# Patient Record
Sex: MALE | Race: WHITE | NOT HISPANIC OR LATINO | URBAN - METROPOLITAN AREA
[De-identification: names, ages, dates, MRNs, and addresses within clinical notes are randomized per-mention and may not be internally consistent; named-entity substitution may affect disease eponyms.]

---

## 2018-10-23 ENCOUNTER — INPATIENT (INPATIENT)
Facility: HOSPITAL | Age: 23
LOS: 14 days | Discharge: HOME | End: 2018-11-07
Attending: PSYCHIATRY & NEUROLOGY | Admitting: PSYCHIATRY & NEUROLOGY
Payer: COMMERCIAL

## 2018-10-23 VITALS
RESPIRATION RATE: 16 BRPM | TEMPERATURE: 99 F | HEART RATE: 100 BPM | DIASTOLIC BLOOD PRESSURE: 78 MMHG | OXYGEN SATURATION: 100 % | SYSTOLIC BLOOD PRESSURE: 135 MMHG

## 2018-10-23 DIAGNOSIS — F33.9 MAJOR DEPRESSIVE DISORDER, RECURRENT, UNSPECIFIED: ICD-10-CM

## 2018-10-23 DIAGNOSIS — F32.9 MAJOR DEPRESSIVE DISORDER, SINGLE EPISODE, UNSPECIFIED: ICD-10-CM

## 2018-10-23 DIAGNOSIS — R69 ILLNESS, UNSPECIFIED: ICD-10-CM

## 2018-10-23 LAB
ALBUMIN SERPL ELPH-MCNC: 4.8 G/DL — SIGNIFICANT CHANGE UP (ref 3.3–5)
ALP SERPL-CCNC: 100 U/L — SIGNIFICANT CHANGE UP (ref 40–120)
ALT FLD-CCNC: 26 U/L — SIGNIFICANT CHANGE UP (ref 4–41)
AMPHET UR-MCNC: NEGATIVE — SIGNIFICANT CHANGE UP
APAP SERPL-MCNC: < 15 UG/ML — LOW (ref 15–25)
APPEARANCE UR: CLEAR — SIGNIFICANT CHANGE UP
AST SERPL-CCNC: 16 U/L — SIGNIFICANT CHANGE UP (ref 4–40)
BARBITURATES UR SCN-MCNC: NEGATIVE — SIGNIFICANT CHANGE UP
BASOPHILS # BLD AUTO: 0.07 K/UL — SIGNIFICANT CHANGE UP (ref 0–0.2)
BASOPHILS NFR BLD AUTO: 0.8 % — SIGNIFICANT CHANGE UP (ref 0–2)
BENZODIAZ UR-MCNC: NEGATIVE — SIGNIFICANT CHANGE UP
BILIRUB SERPL-MCNC: 0.8 MG/DL — SIGNIFICANT CHANGE UP (ref 0.2–1.2)
BILIRUB UR-MCNC: NEGATIVE — SIGNIFICANT CHANGE UP
BLOOD UR QL VISUAL: NEGATIVE — SIGNIFICANT CHANGE UP
BUN SERPL-MCNC: 13 MG/DL — SIGNIFICANT CHANGE UP (ref 7–23)
CALCIUM SERPL-MCNC: 9.8 MG/DL — SIGNIFICANT CHANGE UP (ref 8.4–10.5)
CANNABINOIDS UR-MCNC: NEGATIVE — SIGNIFICANT CHANGE UP
CHLORIDE SERPL-SCNC: 101 MMOL/L — SIGNIFICANT CHANGE UP (ref 98–107)
CO2 SERPL-SCNC: 22 MMOL/L — SIGNIFICANT CHANGE UP (ref 22–31)
COCAINE METAB.OTHER UR-MCNC: NEGATIVE — SIGNIFICANT CHANGE UP
COLOR SPEC: SIGNIFICANT CHANGE UP
CREAT SERPL-MCNC: 0.91 MG/DL — SIGNIFICANT CHANGE UP (ref 0.5–1.3)
EOSINOPHIL # BLD AUTO: 0.19 K/UL — SIGNIFICANT CHANGE UP (ref 0–0.5)
EOSINOPHIL NFR BLD AUTO: 2.3 % — SIGNIFICANT CHANGE UP (ref 0–6)
ETHANOL BLD-MCNC: < 10 MG/DL — SIGNIFICANT CHANGE UP
GLUCOSE SERPL-MCNC: 98 MG/DL — SIGNIFICANT CHANGE UP (ref 70–99)
GLUCOSE UR-MCNC: NEGATIVE — SIGNIFICANT CHANGE UP
HCT VFR BLD CALC: 47.2 % — SIGNIFICANT CHANGE UP (ref 39–50)
HGB BLD-MCNC: 15.3 G/DL — SIGNIFICANT CHANGE UP (ref 13–17)
IMM GRANULOCYTES # BLD AUTO: 0.03 # — SIGNIFICANT CHANGE UP
IMM GRANULOCYTES NFR BLD AUTO: 0.4 % — SIGNIFICANT CHANGE UP (ref 0–1.5)
KETONES UR-MCNC: SIGNIFICANT CHANGE UP
LEUKOCYTE ESTERASE UR-ACNC: NEGATIVE — SIGNIFICANT CHANGE UP
LYMPHOCYTES # BLD AUTO: 1.89 K/UL — SIGNIFICANT CHANGE UP (ref 1–3.3)
LYMPHOCYTES # BLD AUTO: 22.7 % — SIGNIFICANT CHANGE UP (ref 13–44)
MCHC RBC-ENTMCNC: 27.7 PG — SIGNIFICANT CHANGE UP (ref 27–34)
MCHC RBC-ENTMCNC: 32.4 % — SIGNIFICANT CHANGE UP (ref 32–36)
MCV RBC AUTO: 85.4 FL — SIGNIFICANT CHANGE UP (ref 80–100)
METHADONE UR-MCNC: NEGATIVE — SIGNIFICANT CHANGE UP
MONOCYTES # BLD AUTO: 0.57 K/UL — SIGNIFICANT CHANGE UP (ref 0–0.9)
MONOCYTES NFR BLD AUTO: 6.8 % — SIGNIFICANT CHANGE UP (ref 2–14)
NEUTROPHILS # BLD AUTO: 5.59 K/UL — SIGNIFICANT CHANGE UP (ref 1.8–7.4)
NEUTROPHILS NFR BLD AUTO: 67 % — SIGNIFICANT CHANGE UP (ref 43–77)
NITRITE UR-MCNC: NEGATIVE — SIGNIFICANT CHANGE UP
NRBC # FLD: 0 — SIGNIFICANT CHANGE UP
OPIATES UR-MCNC: NEGATIVE — SIGNIFICANT CHANGE UP
OXYCODONE UR-MCNC: NEGATIVE — SIGNIFICANT CHANGE UP
PCP UR-MCNC: NEGATIVE — SIGNIFICANT CHANGE UP
PH UR: 7 — SIGNIFICANT CHANGE UP (ref 5–8)
PLATELET # BLD AUTO: 315 K/UL — SIGNIFICANT CHANGE UP (ref 150–400)
PMV BLD: 10.7 FL — SIGNIFICANT CHANGE UP (ref 7–13)
POTASSIUM SERPL-MCNC: 4.1 MMOL/L — SIGNIFICANT CHANGE UP (ref 3.5–5.3)
POTASSIUM SERPL-SCNC: 4.1 MMOL/L — SIGNIFICANT CHANGE UP (ref 3.5–5.3)
PROT SERPL-MCNC: 8 G/DL — SIGNIFICANT CHANGE UP (ref 6–8.3)
PROT UR-MCNC: NEGATIVE — SIGNIFICANT CHANGE UP
RBC # BLD: 5.53 M/UL — SIGNIFICANT CHANGE UP (ref 4.2–5.8)
RBC # FLD: 13.2 % — SIGNIFICANT CHANGE UP (ref 10.3–14.5)
SALICYLATES SERPL-MCNC: < 5 MG/DL — LOW (ref 15–30)
SODIUM SERPL-SCNC: 139 MMOL/L — SIGNIFICANT CHANGE UP (ref 135–145)
SP GR SPEC: 1.02 — SIGNIFICANT CHANGE UP (ref 1–1.04)
TSH SERPL-MCNC: 2.45 UIU/ML — SIGNIFICANT CHANGE UP (ref 0.27–4.2)
UROBILINOGEN FLD QL: NORMAL — SIGNIFICANT CHANGE UP
WBC # BLD: 8.34 K/UL — SIGNIFICANT CHANGE UP (ref 3.8–10.5)
WBC # FLD AUTO: 8.34 K/UL — SIGNIFICANT CHANGE UP (ref 3.8–10.5)

## 2018-10-23 PROCEDURE — 99285 EMERGENCY DEPT VISIT HI MDM: CPT

## 2018-10-23 RX ORDER — SERTRALINE 25 MG/1
25 TABLET, FILM COATED ORAL DAILY
Qty: 0 | Refills: 0 | Status: DISCONTINUED | OUTPATIENT
Start: 2018-10-23 | End: 2018-10-26

## 2018-10-23 RX ORDER — LANOLIN ALCOHOL/MO/W.PET/CERES
3 CREAM (GRAM) TOPICAL AT BEDTIME
Qty: 0 | Refills: 0 | Status: DISCONTINUED | OUTPATIENT
Start: 2018-10-23 | End: 2018-11-07

## 2018-10-23 RX ADMIN — Medication 1 MILLIGRAM(S): at 21:53

## 2018-10-23 NOTE — ED BEHAVIORAL HEALTH ASSESSMENT NOTE - CASE SUMMARY
22 year old single male, unemployed, non-caregiver, domiciled in a private residence with family, currently attends [employer], no PPH, no history of inpatient admissions, history of one past suicide attempt, no history of aggression/violence or legal issues is BIBA for suicidal ideation referred from student counseling center.  Patient presents to the ER in the context of suicidal ideation with specific plan and intent.  He has been feeling increasingly depressed and worthless due to multiple psychosocial stressors and recently had to withdraw from school due to the severity of depressive symptoms.  Patient is unable to contract for safety outside of the hospital environment and presents an imminent risk to self requiring inpatient admission for safety and stabilization at this time.  Patient agreeing to voluntary inpatient admission and will be admitted to [place] [address] on a 9.13, voluntary legal status as no beds currently available on 1S or 1N.  No need for constant observation in a locked, supervised setting.  Recommend transportation to unit accompanied by security for safety.

## 2018-10-23 NOTE — ED PROVIDER NOTE - PRINCIPAL DIAGNOSIS
Current episode of major depressive disorder without prior episode, unspecified depression episode severity

## 2018-10-23 NOTE — ED PROVIDER NOTE - CARE PLAN
Principal Discharge DX:	Current episode of major depressive disorder without prior episode, unspecified depression episode severity

## 2018-10-23 NOTE — ED BEHAVIORAL HEALTH ASSESSMENT NOTE - HPI (INCLUDE ILLNESS QUALITY, SEVERITY, DURATION, TIMING, CONTEXT, MODIFYING FACTORS, ASSOCIATED SIGNS AND SYMPTOMS)
Patient is a 22 year old single unemployed non-caregiver mixed racial male currently residing in a private residence with family. No PPH. No history of inpatient admissions. Hx of 1 past suicide attempts - attempted Patient is a 22 year old single unemployed non-caregiver mixed racial male currently residing in a private residence with family. Currently attends [employer]. No PPH. No history of inpatient admissions. Hx of 1 past suicide attempt - attempted a few years ago to hang self but tie broke (no medical treatment). He has no history of aggression/violence or legal issues. Patient endorses drinking alcohol 1 shot 1x per week, last drank a few days ago. He denies history of rehab/detox, withdrawal symptoms/DTs or seizures. PMH includes acid reflux and pre-ulcer. BIBA for suicidal ideation.    Patient reports he came to the ER because the school sent him stating he is a threat to himself. He reports he recently withdrew from his classes 3 weeks ago due to "trouble keeping up the work." He went today to organize everything and told the individual at school he was having suicidal ideation to hang himself with a belt. Patient endorses suicidal ideation x a few months to hang himself with a belt which he does have. He stated he has been feeling this due to multiple recent stressors including recently graduating and having to leave his job due to their moving location.    Patient endorses feeling depressed x many years worsening over the past few months, poor sleep, decreased appetite with 10lb weight loss, worthlessness, anxiety, fluctuating concentration and energy. He stated he struggled to get out of bed and go to class and complete his work due to his symptoms and this is why he withdrew. He was unable to contract for safety outside of the hospital stating he does not know if he would be safe if he left and cannot guarantee he wouldn't hang himself tomorrow or the next day. Patient denies any hallucinations, does not report any delusional thought content, denies thought insertion/withdrawal, denies referential thought processes & is not paranoid on interview. Pt is linear, logical, organized and well related. Patient does not report nor exhibit any signs of lynda, including irritable or elevated mood, grandiosity, pressured speech, risk-taking behaviors, increase in productivity or agitation. Patient denies any HI, violent thoughts.     See  note for collateral information.    Patient refused to have any friends or family contacted for further collateral information. Patient is a 22 year old single unemployed non-caregiver mixed racial male currently residing in a private residence with family. Recently withdrew from classes at [employer]. No PPH. No history of inpatient admissions. Hx of 1 past suicide attempt - attempted a few years ago to hang self but tie broke (no medical treatment). He has no history of aggression/violence or legal issues. Patient endorses drinking alcohol 1 shot 1x per week, last drank a few days ago. He denies history of rehab/detox, withdrawal symptoms/DTs or seizures. PMH includes acid reflux and pre-ulcer. BIBA for suicidal ideation.    Patient reports he came to the ER because the school sent him stating he is a threat to himself. He reports he recently withdrew from his classes 3 weeks ago due to "trouble keeping up the work." He went today to organize everything and told the individuals at school he was having suicidal ideation to hang himself with a belt. Patient endorses suicidal ideation x a few months to hang himself with a belt. He stated he has been feeling this way due to multiple recent stressors including recently graduating and having to leave his job due to their moving location.    Patient endorses feeling depressed x many years worsening over the past few months, poor sleep, decreased appetite with 10lb weight loss, worthlessness, anxiety, fluctuating concentration and energy. He stated he struggled to get out of bed, go to class and complete his work due to his symptoms and this is why he withdrew. He was unable to contract for safety outside of the hospital stating he does not know if he would be safe if he left and cannot guarantee he wouldn't hang himself tomorrow or the next day. Patient denies any hallucinations, does not report any delusional thought content, denies thought insertion/withdrawal, denies referential thought processes & is not paranoid on interview. Pt is linear, logical, organized and well related. Patient does not report nor exhibit any signs of lynda, including irritable or elevated mood, grandiosity, pressured speech, risk-taking behaviors, increase in productivity or agitation. Patient denies any HI, violent thoughts.     See  note for collateral information.    Patient refused to have any friends or family contacted for further collateral information or disposition. Patient is a 22 year old single unemployed non-caregiver mixed racial male currently residing in a private residence with family. Recently withdrew from classes at [employer]. No PPH. No history of inpatient admissions. Hx of 1 past suicide attempt - attempted a few years ago to hang self but tie broke (no medical treatment). He has no history of aggression/violence or legal issues. Patient endorses drinking alcohol 1 shot 1x per week, last drank a few days ago. He denies history of rehab/detox, withdrawal symptoms/DTs or seizures. PMH includes acid reflux and pre-ulcer. BIBA for suicidal ideation.    Patient reports he came to the ER because the school sent him stating he is a threat to himself. He reports he recently withdrew from his classes 3 weeks ago due to "trouble keeping up the work." He went today to organize everything and told the individuals at school he was having suicidal ideation to hang himself with a belt. Patient endorses suicidal ideation x a few months to hang himself with a belt. He stated he has been feeling this way due to multiple recent stressors including recently graduating and having to leave his job due to their moving location.    Patient endorses feeling depressed x many years worsening over the past few months, poor sleep, decreased appetite with 10lb weight loss, worthlessness, anxiety, fluctuating concentration and energy. He stated he struggled to get out of bed, go to class and complete his work due to his symptoms and this is why he withdrew. He was unable to contract for safety outside of the hospital stating he does not know if he would be safe if he left and cannot guarantee he wouldn't hang himself tomorrow or the next day. Patient denies any hallucinations, does not report any delusional thought content, denies thought insertion/withdrawal, denies referential thought processes & is not paranoid on interview. Pt is linear, logical, organized and well related. Patient does not report nor exhibit any signs of lynda, including irritable or elevated mood, grandiosity, pressured speech, risk-taking behaviors, increase in productivity or agitation. Patient denies any HI, violent thoughts.     See  note for collateral information.    Patient refused to have any friends or family contacted for further collateral information or to inform regarding disposition.

## 2018-10-23 NOTE — ED ADULT NURSE REASSESSMENT NOTE - NS ED NURSE REASSESS COMMENT FT1
Pt calm & cooperative aware of admission to  Low 3 pt denies Hi/AVh presently pt left with security accompanied by a PES.

## 2018-10-23 NOTE — ED PROVIDER NOTE - OBJECTIVE STATEMENT
Patient is a 22 year old male wit history of depression presenting with worsening depression and SI. Pt notes he has been feeling this way for several months but has worsened over the past several days. He told his school counselor who referred him to psych ED. + plan for hanging. + prior attempt with hanging approx 5 years ago. Denies drugs or etoh. No fevers, chills, chest pain, sob, abd pain. No travel. No meds. + insomnia

## 2018-10-23 NOTE — ED BEHAVIORAL HEALTH ASSESSMENT NOTE - RISK ASSESSMENT
patient presents an imminent risk to self as evidence by suicidal ideation with plan/intent, depression, anhedonia, worthlessness, unable to contract for safety outside of the hospital environment

## 2018-10-23 NOTE — ED BEHAVIORAL HEALTH ASSESSMENT NOTE - OTHER PAST PSYCHIATRIC HISTORY (INCLUDE DETAILS REGARDING ONSET, COURSE OF ILLNESS, INPATIENT/OUTPATIENT TREATMENT)
No PPH. No history of inpatient admissions. Hx of 1 past suicide attempts - attempted a few years ago to hang self but tie broke (no medical treatment). No outpatient treatment.

## 2018-10-23 NOTE — ED BEHAVIORAL HEALTH ASSESSMENT NOTE - SUMMARY
Patient is a 22 year old single unemployed non-caregiver mixed racial male currently residing in a private residence with family. Currently attends [employer]. No PPH. No history of inpatient admissions. Hx of 1 past suicide attempt - attempted a few years ago to hang self but tie broke (no medical treatment). He has no history of aggression/violence or legal issues. Patient endorses drinking alcohol 1 shot 1x per week, last drank a few days ago. He denies history of rehab/detox, withdrawal symptoms/DTs or seizures. PMH includes acid reflux and pre-ulcer. BIBA for suicidal ideation.    Patient presents to the ER in the context of suicidal ideation with plan/intent. Patient has been having suicidal ideation to hang himself with a belt that he owns x a few months. He has been feeling increasingly depressed and worthless due to multiple psychosocial stressors and recently had to withdraw from school due to the severity of symptoms. He is unable to contract for safety outside of the hospital environment and presents an imminent risk to self requiring inpatient admission for safety and stabilization. Patient agreeing to voluntary inpatient admission.    Patient denies active intent/SI while in the hospital. He denied HI and is not aggressive or violent. Patient agreed he could alert staff if he felt worse or had urges to harm self or others. No 1:1 needed. Patient is a 22 year old single unemployed non-caregiver mixed racial male currently residing in a private residence with family. Currently attends [employer]. No PPH. No history of inpatient admissions. Hx of 1 past suicide attempt - attempted a few years ago to hang self but tie broke (no medical treatment). He has no history of aggression/violence or legal issues. Patient endorses drinking alcohol 1 shot 1x per week, last drank a few days ago. He denies history of rehab/detox, withdrawal symptoms/DTs or seizures. PMH includes acid reflux and pre-ulcer. BIBA for suicidal ideation.    Patient presents to the ER in the context of suicidal ideation with plan/intent. Patient has been having suicidal ideation to hang himself with a belt that he owns x a few months. He has been feeling increasingly depressed and worthless due to multiple psychosocial stressors and recently had to withdraw from school due to the severity of symptoms. He is unable to contract for safety outside of the hospital environment and presents an imminent risk to self requiring inpatient admission for safety and stabilization. Patient agreeing to voluntary inpatient admission.    Patient denies active intent to harm self while in the hospital. He denied HI and is not aggressive or violent. Patient agreed he could alert staff if he felt worse or had urges to harm self or others. No 1:1 needed. Patient is a 22 year old single unemployed non-caregiver mixed racial male currently residing in a private residence with family. Currently attends [employer]. No PPH. No history of inpatient admissions. Hx of 1 past suicide attempt - attempted a few years ago to hang self but tie broke (no medical treatment). He has no history of aggression/violence or legal issues. Patient endorses drinking alcohol 1 shot 1x per week, last drank a few days ago. He denies history of rehab/detox, withdrawal symptoms/DTs or seizures. PMH includes acid reflux and pre-ulcer. BIBA for suicidal ideation.    Patient presents to the ER in the context of suicidal ideation with plan/intent. Patient has been having suicidal ideation to hang himself with a belt that he owns x a few months. He has been feeling increasingly depressed and worthless due to multiple psychosocial stressors and recently had to withdraw from school due to the severity of symptoms. He is unable to contract for safety outside of the hospital environment and presents an imminent risk to self requiring inpatient admission for safety and stabilization. Patient agreeing to voluntary inpatient admission.    Patient denies active intent/plan to harm self while in the hospital. He denied HI and is not aggressive or violent. Patient agreed he could alert staff if he felt worse or had urges to harm self or others. No 1:1 needed.

## 2018-10-23 NOTE — ED ADULT TRIAGE NOTE - CHIEF COMPLAINT QUOTE
Pt states that he feel like he wants to kill himself, states that he plans to hang himself with a belt.  States that he previously attempted the same but did not tell anyone.  Denies drugs or alcohol.  Calm and cooperative in triage

## 2018-10-23 NOTE — ED BEHAVIORAL HEALTH ASSESSMENT NOTE - SUICIDE RISK FACTORS
Access to means (pills, firearms, etc.)/Mood episode/Agitation/severe anxiety/Global insomnia/Anhedonia

## 2018-10-23 NOTE — ED ADULT NURSE NOTE - OBJECTIVE STATEMENT
Received pt in  pt calm & cooperative c/o depression & Si pt denies Hi/AVh presently emotional reassurance provided safety & comfort measures maintained eval on going.

## 2018-10-23 NOTE — ED BEHAVIORAL HEALTH NOTE - BEHAVIORAL HEALTH NOTE
Received collateral from Geno Escalante, PhD who is the clinical supervisor [phone] at the student counseling center of [employer] who was seen today for a crisis appointment upon self referral.  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  Patient had reportedly told his sister that his records would belong to her if something should happen to him but no reports of a suicide note.  Patient has a reported past suicide attempt 5 years ago when he tied his tie to a door knob at that time.  No reported history of psychiatric hospitalizations.  Patient has expressed to therapist suicidal plan to hang himself with a belt at this time and reportedly has been drinking alcohol at times at night to assist with sleep difficulties.  Geno Escalante expresses acute safety concerns for patient at this time and is advocating for psychiatric admission.  Upon completion of assessment Geno Escalante was informed of disposition of admission to [address] and is provided unit telephone number.

## 2018-10-23 NOTE — ED ADULT NURSE NOTE - NSIMPLEMENTINTERV_GEN_ALL_ED
Implemented All Universal Safety Interventions:  [place] to call system. Call bell, personal items and telephone within reach. Instruct patient to call for assistance. Room bathroom lighting operational. Non-slip footwear when patient is off stretcher. Physically safe environment: no spills, clutter or unnecessary equipment. Stretcher in lowest position, wheels locked, appropriate side rails in place.

## 2018-10-23 NOTE — ED BEHAVIORAL HEALTH ASSESSMENT NOTE - DESCRIPTION
During course of ED visit patient was calm and cooperative. Patient was not aggressive or violent and did not require PRN medications.     Vital Signs Last 24 Hrs  T(C): 37 (23 Oct 2018 15:25), Max: 37 (23 Oct 2018 15:25)  T(F): 98.6 (23 Oct 2018 15:25), Max: 98.6 (23 Oct 2018 15:25)  HR: 100 (23 Oct 2018 15:25) (100 - 100)  BP: 135/78 (23 Oct 2018 15:25) (135/78 - 135/78)  BP(mean): --  RR: 16 (23 Oct 2018 15:25) (16 - 16)  SpO2: 100% (23 Oct 2018 15:25) (100% - 100%) acid reflux, pre-ulcer see hpi

## 2018-10-24 RX ADMIN — Medication 3 MILLIGRAM(S): at 00:18

## 2018-10-24 RX ADMIN — Medication 3 MILLIGRAM(S): at 23:00

## 2018-10-24 RX ADMIN — Medication 1 MILLIGRAM(S): at 09:53

## 2018-10-24 RX ADMIN — SERTRALINE 25 MILLIGRAM(S): 25 TABLET, FILM COATED ORAL at 08:34

## 2018-10-25 RX ADMIN — SERTRALINE 25 MILLIGRAM(S): 25 TABLET, FILM COATED ORAL at 08:40

## 2018-10-25 RX ADMIN — Medication 1 MILLIGRAM(S): at 09:57

## 2018-10-25 RX ADMIN — Medication 0.5 MILLIGRAM(S): at 21:00

## 2018-10-26 PROCEDURE — 90853 GROUP PSYCHOTHERAPY: CPT

## 2018-10-26 PROCEDURE — 99222 1ST HOSP IP/OBS MODERATE 55: CPT | Mod: 25

## 2018-10-26 RX ORDER — SERTRALINE 25 MG/1
50 TABLET, FILM COATED ORAL DAILY
Qty: 0 | Refills: 0 | Status: DISCONTINUED | OUTPATIENT
Start: 2018-10-26 | End: 2018-10-29

## 2018-10-26 RX ORDER — NICOTINE POLACRILEX 2 MG
1 GUM BUCCAL DAILY
Qty: 0 | Refills: 0 | Status: DISCONTINUED | OUTPATIENT
Start: 2018-10-26 | End: 2018-10-26

## 2018-10-26 RX ADMIN — SERTRALINE 25 MILLIGRAM(S): 25 TABLET, FILM COATED ORAL at 08:27

## 2018-10-26 RX ADMIN — Medication 3 MILLIGRAM(S): at 21:10

## 2018-10-27 PROCEDURE — 99231 SBSQ HOSP IP/OBS SF/LOW 25: CPT

## 2018-10-27 RX ADMIN — Medication 0.5 MILLIGRAM(S): at 11:57

## 2018-10-27 RX ADMIN — SERTRALINE 50 MILLIGRAM(S): 25 TABLET, FILM COATED ORAL at 09:33

## 2018-10-27 RX ADMIN — Medication 0.5 MILLIGRAM(S): at 21:56

## 2018-10-27 RX ADMIN — Medication 3 MILLIGRAM(S): at 21:56

## 2018-10-28 PROCEDURE — 99231 SBSQ HOSP IP/OBS SF/LOW 25: CPT

## 2018-10-28 RX ADMIN — SERTRALINE 50 MILLIGRAM(S): 25 TABLET, FILM COATED ORAL at 09:22

## 2018-10-28 RX ADMIN — Medication 0.5 MILLIGRAM(S): at 22:30

## 2018-10-28 RX ADMIN — Medication 3 MILLIGRAM(S): at 22:30

## 2018-10-29 PROCEDURE — 99232 SBSQ HOSP IP/OBS MODERATE 35: CPT

## 2018-10-29 RX ORDER — CLONAZEPAM 1 MG
0.5 TABLET ORAL
Qty: 0 | Refills: 0 | Status: DISCONTINUED | OUTPATIENT
Start: 2018-10-29 | End: 2018-10-31

## 2018-10-29 RX ORDER — SERTRALINE 25 MG/1
75 TABLET, FILM COATED ORAL DAILY
Qty: 0 | Refills: 0 | Status: DISCONTINUED | OUTPATIENT
Start: 2018-10-29 | End: 2018-11-05

## 2018-10-29 RX ADMIN — Medication 3 MILLIGRAM(S): at 23:39

## 2018-10-29 RX ADMIN — Medication 0.5 MILLIGRAM(S): at 10:16

## 2018-10-29 RX ADMIN — Medication 0.5 MILLIGRAM(S): at 20:27

## 2018-10-29 RX ADMIN — Medication 0.5 MILLIGRAM(S): at 14:45

## 2018-10-29 RX ADMIN — SERTRALINE 50 MILLIGRAM(S): 25 TABLET, FILM COATED ORAL at 08:16

## 2018-10-30 PROCEDURE — 99232 SBSQ HOSP IP/OBS MODERATE 35: CPT

## 2018-10-30 RX ADMIN — Medication 0.5 MILLIGRAM(S): at 23:21

## 2018-10-30 RX ADMIN — SERTRALINE 75 MILLIGRAM(S): 25 TABLET, FILM COATED ORAL at 10:10

## 2018-10-30 RX ADMIN — Medication 0.5 MILLIGRAM(S): at 09:48

## 2018-10-31 PROCEDURE — 99232 SBSQ HOSP IP/OBS MODERATE 35: CPT | Mod: 25

## 2018-10-31 PROCEDURE — 90853 GROUP PSYCHOTHERAPY: CPT

## 2018-10-31 RX ORDER — CLONAZEPAM 1 MG
0.25 TABLET ORAL
Qty: 0 | Refills: 0 | Status: DISCONTINUED | OUTPATIENT
Start: 2018-10-31 | End: 2018-11-05

## 2018-10-31 RX ADMIN — Medication 0.25 MILLIGRAM(S): at 14:54

## 2018-10-31 RX ADMIN — SERTRALINE 75 MILLIGRAM(S): 25 TABLET, FILM COATED ORAL at 08:49

## 2018-10-31 RX ADMIN — Medication 3 MILLIGRAM(S): at 23:41

## 2018-10-31 RX ADMIN — Medication 0.25 MILLIGRAM(S): at 23:41

## 2018-11-01 PROCEDURE — 99232 SBSQ HOSP IP/OBS MODERATE 35: CPT

## 2018-11-01 RX ORDER — CLONAZEPAM 1 MG
1 TABLET ORAL
Qty: 15 | Refills: 0
Start: 2018-11-01 | End: 2018-11-15

## 2018-11-01 RX ORDER — CLONAZEPAM 1 MG
0.5 TABLET ORAL ONCE
Qty: 0 | Refills: 0 | Status: DISCONTINUED | OUTPATIENT
Start: 2018-11-01 | End: 2018-11-01

## 2018-11-01 RX ORDER — SERTRALINE 25 MG/1
1 TABLET, FILM COATED ORAL
Qty: 30 | Refills: 0 | OUTPATIENT
Start: 2018-11-01 | End: 2018-11-30

## 2018-11-01 RX ADMIN — Medication 0.25 MILLIGRAM(S): at 10:17

## 2018-11-01 RX ADMIN — SERTRALINE 75 MILLIGRAM(S): 25 TABLET, FILM COATED ORAL at 09:06

## 2018-11-01 RX ADMIN — Medication 0.5 MILLIGRAM(S): at 14:35

## 2018-11-02 PROCEDURE — 99232 SBSQ HOSP IP/OBS MODERATE 35: CPT | Mod: 25

## 2018-11-02 PROCEDURE — 90853 GROUP PSYCHOTHERAPY: CPT

## 2018-11-02 RX ADMIN — SERTRALINE 75 MILLIGRAM(S): 25 TABLET, FILM COATED ORAL at 09:21

## 2018-11-03 RX ADMIN — SERTRALINE 75 MILLIGRAM(S): 25 TABLET, FILM COATED ORAL at 12:26

## 2018-11-03 RX ADMIN — Medication 0.25 MILLIGRAM(S): at 23:31

## 2018-11-04 PROCEDURE — 99232 SBSQ HOSP IP/OBS MODERATE 35: CPT

## 2018-11-04 RX ADMIN — SERTRALINE 75 MILLIGRAM(S): 25 TABLET, FILM COATED ORAL at 09:23

## 2018-11-05 PROCEDURE — 90853 GROUP PSYCHOTHERAPY: CPT

## 2018-11-05 PROCEDURE — 99232 SBSQ HOSP IP/OBS MODERATE 35: CPT | Mod: 25

## 2018-11-05 RX ORDER — SERTRALINE 25 MG/1
100 TABLET, FILM COATED ORAL DAILY
Qty: 0 | Refills: 0 | Status: DISCONTINUED | OUTPATIENT
Start: 2018-11-05 | End: 2018-11-07

## 2018-11-05 RX ORDER — CLONAZEPAM 1 MG
0.25 TABLET ORAL
Qty: 0 | Refills: 0 | Status: DISCONTINUED | OUTPATIENT
Start: 2018-11-05 | End: 2018-11-07

## 2018-11-05 RX ADMIN — SERTRALINE 75 MILLIGRAM(S): 25 TABLET, FILM COATED ORAL at 08:50

## 2018-11-05 RX ADMIN — Medication 0.25 MILLIGRAM(S): at 16:35

## 2018-11-05 RX ADMIN — Medication 3 MILLIGRAM(S): at 23:45

## 2018-11-06 PROCEDURE — 99232 SBSQ HOSP IP/OBS MODERATE 35: CPT | Mod: 25

## 2018-11-06 PROCEDURE — 90853 GROUP PSYCHOTHERAPY: CPT

## 2018-11-06 RX ORDER — SERTRALINE 25 MG/1
1 TABLET, FILM COATED ORAL
Qty: 30 | Refills: 0
Start: 2018-11-06 | End: 2018-12-05

## 2018-11-06 RX ADMIN — SERTRALINE 100 MILLIGRAM(S): 25 TABLET, FILM COATED ORAL at 08:42

## 2018-11-06 RX ADMIN — Medication 3 MILLIGRAM(S): at 22:46

## 2018-11-07 VITALS — DIASTOLIC BLOOD PRESSURE: 61 MMHG | SYSTOLIC BLOOD PRESSURE: 107 MMHG | TEMPERATURE: 98 F | HEART RATE: 71 BPM

## 2018-11-07 PROCEDURE — 99238 HOSP IP/OBS DSCHRG MGMT 30/<: CPT | Mod: 25

## 2018-11-07 PROCEDURE — 90853 GROUP PSYCHOTHERAPY: CPT

## 2018-11-07 RX ADMIN — SERTRALINE 100 MILLIGRAM(S): 25 TABLET, FILM COATED ORAL at 09:24

## 2018-11-07 RX ADMIN — Medication 0.25 MILLIGRAM(S): at 11:25

## 2020-04-27 ENCOUNTER — INPATIENT (INPATIENT)
Facility: HOSPITAL | Age: 25
LOS: 10 days | Discharge: HOME | End: 2020-05-08
Attending: PSYCHIATRY & NEUROLOGY | Admitting: PSYCHIATRY & NEUROLOGY
Payer: COMMERCIAL

## 2020-04-27 VITALS
OXYGEN SATURATION: 100 % | SYSTOLIC BLOOD PRESSURE: 135 MMHG | TEMPERATURE: 99 F | RESPIRATION RATE: 18 BRPM | DIASTOLIC BLOOD PRESSURE: 84 MMHG | HEART RATE: 94 BPM

## 2020-04-27 DIAGNOSIS — F33.9 MAJOR DEPRESSIVE DISORDER, RECURRENT, UNSPECIFIED: ICD-10-CM

## 2020-04-27 DIAGNOSIS — F32.9 MAJOR DEPRESSIVE DISORDER, SINGLE EPISODE, UNSPECIFIED: ICD-10-CM

## 2020-04-27 LAB
ALBUMIN SERPL ELPH-MCNC: 4.7 G/DL — SIGNIFICANT CHANGE UP (ref 3.3–5)
ALP SERPL-CCNC: 100 U/L — SIGNIFICANT CHANGE UP (ref 40–120)
ALT FLD-CCNC: 24 U/L — SIGNIFICANT CHANGE UP (ref 4–41)
AMPHET UR-MCNC: NEGATIVE — SIGNIFICANT CHANGE UP
ANION GAP SERPL CALC-SCNC: 18 MMO/L — HIGH (ref 7–14)
APAP SERPL-MCNC: < 15 UG/ML — LOW (ref 15–25)
APPEARANCE UR: CLEAR — SIGNIFICANT CHANGE UP
AST SERPL-CCNC: 17 U/L — SIGNIFICANT CHANGE UP (ref 4–40)
BARBITURATES UR SCN-MCNC: NEGATIVE — SIGNIFICANT CHANGE UP
BASOPHILS # BLD AUTO: 0.08 K/UL — SIGNIFICANT CHANGE UP (ref 0–0.2)
BASOPHILS NFR BLD AUTO: 0.9 % — SIGNIFICANT CHANGE UP (ref 0–2)
BENZODIAZ UR-MCNC: NEGATIVE — SIGNIFICANT CHANGE UP
BILIRUB SERPL-MCNC: 0.3 MG/DL — SIGNIFICANT CHANGE UP (ref 0.2–1.2)
BILIRUB UR-MCNC: NEGATIVE — SIGNIFICANT CHANGE UP
BLOOD UR QL VISUAL: NEGATIVE — SIGNIFICANT CHANGE UP
BUN SERPL-MCNC: 17 MG/DL — SIGNIFICANT CHANGE UP (ref 7–23)
CALCIUM SERPL-MCNC: 9.6 MG/DL — SIGNIFICANT CHANGE UP (ref 8.4–10.5)
CANNABINOIDS UR-MCNC: NEGATIVE — SIGNIFICANT CHANGE UP
CHLORIDE SERPL-SCNC: 104 MMOL/L — SIGNIFICANT CHANGE UP (ref 98–107)
CO2 SERPL-SCNC: 19 MMOL/L — LOW (ref 22–31)
COCAINE METAB.OTHER UR-MCNC: NEGATIVE — SIGNIFICANT CHANGE UP
COLOR SPEC: SIGNIFICANT CHANGE UP
CREAT SERPL-MCNC: 0.99 MG/DL — SIGNIFICANT CHANGE UP (ref 0.5–1.3)
EOSINOPHIL # BLD AUTO: 0.56 K/UL — HIGH (ref 0–0.5)
EOSINOPHIL NFR BLD AUTO: 6 % — SIGNIFICANT CHANGE UP (ref 0–6)
ETHANOL BLD-MCNC: < 10 MG/DL — SIGNIFICANT CHANGE UP
GLUCOSE SERPL-MCNC: 100 MG/DL — HIGH (ref 70–99)
GLUCOSE UR-MCNC: NEGATIVE — SIGNIFICANT CHANGE UP
HCT VFR BLD CALC: 45.8 % — SIGNIFICANT CHANGE UP (ref 39–50)
HGB BLD-MCNC: 15.2 G/DL — SIGNIFICANT CHANGE UP (ref 13–17)
IMM GRANULOCYTES NFR BLD AUTO: 0.3 % — SIGNIFICANT CHANGE UP (ref 0–1.5)
KETONES UR-MCNC: NEGATIVE — SIGNIFICANT CHANGE UP
LEUKOCYTE ESTERASE UR-ACNC: NEGATIVE — SIGNIFICANT CHANGE UP
LYMPHOCYTES # BLD AUTO: 3.15 K/UL — SIGNIFICANT CHANGE UP (ref 1–3.3)
LYMPHOCYTES # BLD AUTO: 33.7 % — SIGNIFICANT CHANGE UP (ref 13–44)
MCHC RBC-ENTMCNC: 28.4 PG — SIGNIFICANT CHANGE UP (ref 27–34)
MCHC RBC-ENTMCNC: 33.2 % — SIGNIFICANT CHANGE UP (ref 32–36)
MCV RBC AUTO: 85.4 FL — SIGNIFICANT CHANGE UP (ref 80–100)
METHADONE UR-MCNC: NEGATIVE — SIGNIFICANT CHANGE UP
MONOCYTES # BLD AUTO: 0.52 K/UL — SIGNIFICANT CHANGE UP (ref 0–0.9)
MONOCYTES NFR BLD AUTO: 5.6 % — SIGNIFICANT CHANGE UP (ref 2–14)
NEUTROPHILS # BLD AUTO: 5.01 K/UL — SIGNIFICANT CHANGE UP (ref 1.8–7.4)
NEUTROPHILS NFR BLD AUTO: 53.5 % — SIGNIFICANT CHANGE UP (ref 43–77)
NITRITE UR-MCNC: NEGATIVE — SIGNIFICANT CHANGE UP
NRBC # FLD: 0 K/UL — SIGNIFICANT CHANGE UP (ref 0–0)
OPIATES UR-MCNC: NEGATIVE — SIGNIFICANT CHANGE UP
OXYCODONE UR-MCNC: NEGATIVE — SIGNIFICANT CHANGE UP
PCP UR-MCNC: NEGATIVE — SIGNIFICANT CHANGE UP
PH UR: 6.5 — SIGNIFICANT CHANGE UP (ref 5–8)
PLATELET # BLD AUTO: 309 K/UL — SIGNIFICANT CHANGE UP (ref 150–400)
PMV BLD: 10.6 FL — SIGNIFICANT CHANGE UP (ref 7–13)
POTASSIUM SERPL-MCNC: 3.9 MMOL/L — SIGNIFICANT CHANGE UP (ref 3.5–5.3)
POTASSIUM SERPL-SCNC: 3.9 MMOL/L — SIGNIFICANT CHANGE UP (ref 3.5–5.3)
PROT SERPL-MCNC: 7.9 G/DL — SIGNIFICANT CHANGE UP (ref 6–8.3)
PROT UR-MCNC: 10 — SIGNIFICANT CHANGE UP
RBC # BLD: 5.36 M/UL — SIGNIFICANT CHANGE UP (ref 4.2–5.8)
RBC # FLD: 13 % — SIGNIFICANT CHANGE UP (ref 10.3–14.5)
RBC CASTS # UR COMP ASSIST: SIGNIFICANT CHANGE UP (ref 0–?)
SALICYLATES SERPL-MCNC: < 5 MG/DL — LOW (ref 15–30)
SODIUM SERPL-SCNC: 141 MMOL/L — SIGNIFICANT CHANGE UP (ref 135–145)
SP GR SPEC: 1.03 — SIGNIFICANT CHANGE UP (ref 1–1.04)
TSH SERPL-MCNC: 3.01 UIU/ML — SIGNIFICANT CHANGE UP (ref 0.27–4.2)
UROBILINOGEN FLD QL: NORMAL — SIGNIFICANT CHANGE UP
WBC # BLD: 9.35 K/UL — SIGNIFICANT CHANGE UP (ref 3.8–10.5)
WBC # FLD AUTO: 9.35 K/UL — SIGNIFICANT CHANGE UP (ref 3.8–10.5)
WBC UR QL: SIGNIFICANT CHANGE UP (ref 0–?)

## 2020-04-27 RX ORDER — SERTRALINE 25 MG/1
100 TABLET, FILM COATED ORAL DAILY
Refills: 0 | Status: DISCONTINUED | OUTPATIENT
Start: 2020-04-27 | End: 2020-04-28

## 2020-04-27 RX ORDER — CLONAZEPAM 1 MG
0.25 TABLET ORAL DAILY
Refills: 0 | Status: DISCONTINUED | OUTPATIENT
Start: 2020-04-27 | End: 2020-04-29

## 2020-04-27 RX ADMIN — Medication 1 MILLIGRAM(S): at 23:45

## 2020-04-27 NOTE — ED BEHAVIORAL HEALTH ASSESSMENT NOTE - SUICIDE RISK FACTORS
Unable to engage in safety planning/Insomnia/Access to lethal methods (pills, firearm, etc.: Ask specifically about presence or absence of a firearm in the home or ease of accessing/Hopelessness or despair/History of abuse/trauma/Anhedonia/Current mood episode
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
## 2020-04-27 NOTE — ED BEHAVIORAL HEALTH NOTE - BEHAVIORAL HEALTH NOTE
JAMI contacted Allen Schoenfeld pt father ([phone]) to obtain collateral. Pt lives with mother, dad, and sister. 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 Pt has a regularly scheduled Monday appointment with his SW, Rocky ([phone]) who works for [employer] ([phone]). Pt had called his SW and told him he was depressed and suicidal. Gerardo stated pt was hospitalized at [facility] about a year and a half ago for being depressed. Pt takes Zoloft 100mg QAM. Gerardo stated pt has had SI but no attempts. Pt has no AH/VH, no SIB, and no HI, and no access to firearms. Pt does not take any drugs and occasionally will drink alcohol. Gerardo stated pt was not acting any differently and has been laughing and would have never thought he felt this way. JAMI informed Gerardo that pt has voluntarily signed himself in and is being admitted to [facility] and provided contact information.

## 2020-04-27 NOTE — ED BEHAVIORAL HEALTH ASSESSMENT NOTE - AXIS III
Problem: Pain:  Goal: Pain level will decrease  Description  Pain level will decrease  Outcome: Ongoing  Note:   Generalized pain 6/10. Pain in chest with coughing 5-6/10. PRN tylenol and motrin given as needed for pain and fever. Problem: Respiratory:  Goal: Ability to maintain normal respiratory secretions will improve  Description  Ability to maintain normal respiratory secretions will improve  Outcome: Ongoing  Note:   Patient positive for Flu A. Lung clear/diminished. Pulse ox remains above 90%. No need for supplemental oxygen. Patient in droplet isolation. Tamiflu ordered BID. Problem: HEMODYNAMIC STATUS  Goal: Patient has stable vital signs and fluid balance  Outcome: Ongoing  Note:   Monitoring heart rate. ST on telemetry. -160's. 2 -0.9 bolus given. 0.9 now infusing at 100ml/hr. Will continue to monitor. Problem: Pain:  Goal: Control of acute pain  Description  Control of acute pain  Outcome: Completed     Problem: Pain:  Goal: Control of chronic pain  Description  Control of chronic pain  Outcome: Completed     Care plan reviewed with patient. Patient verbalizes understanding of the care plan and contributed to goal setting. GERD

## 2020-04-27 NOTE — ED ADULT NURSE NOTE - ED STAT RN HANDOFF DETAILS
Patient is A&Ox4 aware of plan of care, and has room available in [facility].  Report given to nurse on floor via phone.  PT transported with security, ancillary staff. Patient awaiting transportation.  Will continue to monitor patient closely. MAGDALENE Coronado R.N.

## 2020-04-27 NOTE — ED ADULT TRIAGE NOTE - CHIEF COMPLAINT QUOTE
Pt presents to ED for psych evaluation for increased depression. States he had SI over the weekend, denies this currently. Denies HI, auditory or visual hallucinations. Denies drug or alcohol use. Calm and cooperative. Pt presents to ED for psych evaluation for increased depression. Hx of depression and takes zoloft daily.  States he had SI over the weekend, denies this currently. Denies HI, auditory or visual hallucinations. Denies drug or alcohol use. Calm and cooperative.
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
## 2020-04-27 NOTE — ED BEHAVIORAL HEALTH ASSESSMENT NOTE - DESCRIPTION
Calm    Vital Signs - Last 24 Hrs    T(C): 37.2 (04-27-20 @ 16:12), Max: 37.2 (04-27-20 @ 16:12)  HR: 94 (04-27-20 @ 16:12) (94 - 94)  BP: 135/84 (04-27-20 @ 16:12) (135/84 - 135/84)  RR: 18 (04-27-20 @ 16:12) (18 - 18)  SpO2: 100% (04-27-20 @ 16:12) (100% - 100%)  Wt(kg): --  Daily     Daily     I&O's Summary GERD Lives with parents and sister, in grad school for teaching at [employer], has girlfriend and social network
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
## 2020-04-27 NOTE — ED BEHAVIORAL HEALTH ASSESSMENT NOTE - CASE SUMMARY
24M with depression and anxiety presents to the ED on advice of therapist for worsening symptoms. Patient reports worsening symptoms of a depressive episode with more intense and frequent suicidal thoughts with a plan to crash car. He feels unable to keep himself safe and is requesting hospitalization, his current level of symptoms and impact on his functioning make voluntary inpatient admission appropriate. I agree with note/plan documented above.

## 2020-04-27 NOTE — ED ADULT NURSE NOTE - CHIEF COMPLAINT QUOTE
Pt presents to ED for psych evaluation for increased depression. Hx of depression and takes zoloft daily.  States he had SI over the weekend, denies this currently. Denies HI, auditory or visual hallucinations. Denies drug or alcohol use. Calm and cooperative.

## 2020-04-27 NOTE — ED BEHAVIORAL HEALTH ASSESSMENT NOTE - OTHER PAST PSYCHIATRIC HISTORY (INCLUDE DETAILS REGARDING ONSET, COURSE OF ILLNESS, INPATIENT/OUTPATIENT TREATMENT)
1 admission at [facility] in 2018 for depression/suicidality, now inc are at [facility]    3 suicide attempts

## 2020-04-27 NOTE — ED ADULT NURSE NOTE - NS ED NOTE ABUSE RESPONSE YN
[facility]  History & Physical    Nursery    Patient Name:  Girl Jenifer Huffman  MRN: [id number]  Admission Date: 2017    Subjective:     Chief Complaint/Reason for Admission:  Infant is a 0 days  Girl Jenifer Huffman born at 40w1d  Infant was born on 2016 at 7:19 AM via Vaginal, Spontaneous Delivery.        Maternal History:  The mother is a 23 y.o.   . She  has no past medical history on file.     Prenatal Labs Review:  ABO/Rh:   Lab Results   Component Value Date/Time    GROUPTRH B POS 2017 03:00 AM     Group B Beta Strep:   Lab Results   Component Value Date/Time    STREPBCULT No Group B Streptococcus isolated 2017 09:24 AM     HIV: 2017: HIV 1/2 Ag/Ab Negative (Ref range: Negative)  RPR:   Lab Results   Component Value Date/Time    RPR Non-reactive 2017 09:45 AM     Hepatitis B Surface Antigen:   Lab Results   Component Value Date/Time    HEPBSAG Negative 2016 03:28 PM     Rubella Immune Status:   Lab Results   Component Value Date/Time    RUBELLAIMMUN Reactive 2016 03:28 PM       Pregnancy/Delivery Course:  The pregnancy was uncomplicated. Prenatal ultrasound revealed normal anatomy. Prenatal care was good. Mother received no medications. Membranes ruptured on    by   . The delivery was uncomplicated. Apgar scores .    Review of Systems   Constitutional: Negative for activity change, appetite change and fever.   HENT: Negative for congestion and rhinorrhea.    Eyes: Negative for discharge.   Respiratory: Negative for cough.    Gastrointestinal: Negative for diarrhea and vomiting.   Skin: Negative for rash.       Objective:     Vital Signs (Most Recent)       Most Recent    Admission    Admission      Admission Length:      Physical Exam   Constitutional: No distress.   HENT:   Head: Anterior fontanelle is flat.   Right Ear: Tympanic membrane normal.   Left Ear: Tympanic membrane normal.   Nose: No nasal discharge.   Mouth/Throat: Mucous  membranes are moist. Oropharynx is clear. Pharynx is normal.   Right parietal edema, does not cross suture lines.  Approximately 2 inches in diameter.   Eyes: Conjunctivae are normal. Red reflex is present bilaterally. Right eye exhibits no discharge. Left eye exhibits no discharge.   Neck: Normal range of motion.   Cardiovascular: Normal rate and regular rhythm.    No murmur heard.  Pulmonary/Chest: Effort normal and breath sounds normal. No respiratory distress.   Abdominal: Soft. Bowel sounds are normal. She exhibits no mass. There is no hepatosplenomegaly.   Genitourinary: No labial rash.   Musculoskeletal: Normal range of motion.   No hip clicks.   Lymphadenopathy:     She has no cervical adenopathy.   Neurological: She is alert. She has normal strength. She exhibits normal muscle tone. Symmetric [place].   Skin: Skin is warm. Turgor is turgor normal. No jaundice.   Vitals reviewed.    No results found for this or any previous visit (from the past 168 hour(s)).    Assessment and Plan:     Admission Diagnoses:   Active Hospital Problems    Diagnosis  POA    Single liveborn, born in hospital, delivered by vaginal delivery [Z38.00]  Yes    Cephalohematoma [P12.0]  Yes      Resolved Hospital Problems    Diagnosis Date Resolved POA   No resolved problems to display.       LItzel Burt MD  Pediatrics  [facility]   Yes

## 2020-04-27 NOTE — ED BEHAVIORAL HEALTH ASSESSMENT NOTE - SUMMARY
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    Ezequiel endorses worsening depression the past few weeks, with fatigue, hopelessness, anhedonia and suicidal ideation. He described two instances where he considered driving his car into a wall to kill himself. He denied SI at time of encounter but expressed concern for his ability to keep himself safe. Given above symptoms and evident safety risk, Ezequiel will be hospitalized. He agrees to sign in on 9.13 status.

## 2020-04-27 NOTE — ED PROVIDER NOTE - CLINICAL SUMMARY MEDICAL DECISION MAKING FREE TEXT BOX
This is a 24 yr old M, pmh mdd with c/o sever depression and passive SI. Labs, ua tox serum- results wnl, psych consult- recommendation inpatient treatment.

## 2020-04-27 NOTE — ED BEHAVIORAL HEALTH ASSESSMENT NOTE - HPI (INCLUDE ILLNESS QUALITY, SEVERITY, DURATION, TIMING, CONTEXT, MODIFYING FACTORS, ASSOCIATED SIGNS AND SYMPTOMS)
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    Ezequiel was interviewed in a private room where he was calm and cooperative, and a thorough historian. He explained that he has felt increasingly despondent over the past 5 weeks, having less and less motivation, less and less energy, persistent sad mood, poor appetite and thoughts of death. He stated that he has been driving alone to feel better but this past Thursday and Saturday had thoughts about driving his car into a wall to kill himself. He did not act on these impulses for fear of the harm he could do to others, but said the desire to die remained. Ezequiel endorsed anhedonia, hypersomnia and hopelessness. He denied suicidal intent at time of our encounter but stated he did not feel safe and was not sure he would feel any better if he just went home. He denied substance use, denied access to firearm, denied psychotic or manic symptoms. He endorsed compliance with medication. Ezequiel signed in to [facility] voluntarily when offered.     See  note for collateral from Ezequiel's father.

## 2020-04-27 NOTE — ED ADULT NURSE NOTE - OBJECTIVE STATEMENT
Received pt in . AA0X3. C/o worsening depression symptoms since mid Feb but significantly worse for past 5 weeks. Pt endorses compliance with meds. Endorses passive SI thoughts with some active thoughts last Thursday and Saturday. Denies hearing voices or hallucinations. Denies drug or alcohol use. Pt awaits psych eval. Will continue to monitor.

## 2020-04-28 PROCEDURE — 99222 1ST HOSP IP/OBS MODERATE 55: CPT

## 2020-04-28 RX ORDER — ACETAMINOPHEN 500 MG
650 TABLET ORAL ONCE
Refills: 0 | Status: COMPLETED | OUTPATIENT
Start: 2020-04-28 | End: 2020-04-28

## 2020-04-28 RX ORDER — SERTRALINE 25 MG/1
150 TABLET, FILM COATED ORAL DAILY
Refills: 0 | Status: DISCONTINUED | OUTPATIENT
Start: 2020-04-29 | End: 2020-05-08

## 2020-04-28 RX ORDER — NICOTINE POLACRILEX 2 MG
1 GUM BUCCAL DAILY
Refills: 0 | Status: DISCONTINUED | OUTPATIENT
Start: 2020-04-28 | End: 2020-05-08

## 2020-04-28 RX ADMIN — Medication 650 MILLIGRAM(S): at 21:02

## 2020-04-28 RX ADMIN — SERTRALINE 100 MILLIGRAM(S): 25 TABLET, FILM COATED ORAL at 09:08

## 2020-04-29 PROCEDURE — 99232 SBSQ HOSP IP/OBS MODERATE 35: CPT

## 2020-04-29 RX ORDER — CLONAZEPAM 1 MG
0.25 TABLET ORAL DAILY
Refills: 0 | Status: DISCONTINUED | OUTPATIENT
Start: 2020-04-29 | End: 2020-05-06

## 2020-04-29 RX ORDER — LANOLIN ALCOHOL/MO/W.PET/CERES
3 CREAM (GRAM) TOPICAL AT BEDTIME
Refills: 0 | Status: DISCONTINUED | OUTPATIENT
Start: 2020-04-29 | End: 2020-05-08

## 2020-04-29 RX ADMIN — Medication 1 PATCH: at 09:02

## 2020-04-29 RX ADMIN — Medication 0.25 MILLIGRAM(S): at 22:31

## 2020-04-29 RX ADMIN — Medication 1 PATCH: at 19:14

## 2020-04-29 RX ADMIN — SERTRALINE 150 MILLIGRAM(S): 25 TABLET, FILM COATED ORAL at 09:49

## 2020-04-29 RX ADMIN — Medication 3 MILLIGRAM(S): at 22:52

## 2020-04-30 PROCEDURE — 99231 SBSQ HOSP IP/OBS SF/LOW 25: CPT

## 2020-04-30 RX ADMIN — Medication 1 PATCH: at 08:14

## 2020-04-30 RX ADMIN — SERTRALINE 150 MILLIGRAM(S): 25 TABLET, FILM COATED ORAL at 08:45

## 2020-04-30 RX ADMIN — Medication 1 PATCH: at 09:13

## 2020-04-30 RX ADMIN — Medication 3 MILLIGRAM(S): at 23:06

## 2020-05-01 PROCEDURE — 99232 SBSQ HOSP IP/OBS MODERATE 35: CPT

## 2020-05-01 RX ADMIN — Medication 3 MILLIGRAM(S): at 23:15

## 2020-05-01 RX ADMIN — Medication 1 PATCH: at 09:17

## 2020-05-01 RX ADMIN — Medication 1 PATCH: at 20:01

## 2020-05-01 RX ADMIN — Medication 0.25 MILLIGRAM(S): at 21:00

## 2020-05-01 RX ADMIN — SERTRALINE 150 MILLIGRAM(S): 25 TABLET, FILM COATED ORAL at 09:18

## 2020-05-02 PROCEDURE — 99232 SBSQ HOSP IP/OBS MODERATE 35: CPT

## 2020-05-02 RX ADMIN — Medication 1 PATCH: at 19:38

## 2020-05-02 RX ADMIN — Medication 3 MILLIGRAM(S): at 23:00

## 2020-05-02 RX ADMIN — Medication 0.25 MILLIGRAM(S): at 01:17

## 2020-05-02 RX ADMIN — SERTRALINE 150 MILLIGRAM(S): 25 TABLET, FILM COATED ORAL at 09:35

## 2020-05-02 RX ADMIN — Medication 1 PATCH: at 09:17

## 2020-05-02 RX ADMIN — Medication 1 PATCH: at 09:35

## 2020-05-03 PROCEDURE — 99232 SBSQ HOSP IP/OBS MODERATE 35: CPT

## 2020-05-03 RX ORDER — NICOTINE POLACRILEX 2 MG
2 GUM BUCCAL
Refills: 0 | Status: DISCONTINUED | OUTPATIENT
Start: 2020-05-03 | End: 2020-05-08

## 2020-05-03 RX ADMIN — Medication 1 PATCH: at 09:35

## 2020-05-03 RX ADMIN — Medication 2 MILLIGRAM(S): at 09:46

## 2020-05-03 RX ADMIN — SERTRALINE 150 MILLIGRAM(S): 25 TABLET, FILM COATED ORAL at 09:16

## 2020-05-03 RX ADMIN — Medication 1 PATCH: at 09:46

## 2020-05-03 RX ADMIN — Medication 3 MILLIGRAM(S): at 23:30

## 2020-05-03 RX ADMIN — Medication 1 PATCH: at 19:48

## 2020-05-03 RX ADMIN — Medication 0.25 MILLIGRAM(S): at 20:50

## 2020-05-04 PROCEDURE — 99232 SBSQ HOSP IP/OBS MODERATE 35: CPT

## 2020-05-04 RX ADMIN — Medication 1 PATCH: at 09:07

## 2020-05-04 RX ADMIN — Medication 0.25 MILLIGRAM(S): at 14:15

## 2020-05-04 RX ADMIN — SERTRALINE 150 MILLIGRAM(S): 25 TABLET, FILM COATED ORAL at 08:15

## 2020-05-05 PROCEDURE — 99231 SBSQ HOSP IP/OBS SF/LOW 25: CPT

## 2020-05-05 RX ADMIN — SERTRALINE 150 MILLIGRAM(S): 25 TABLET, FILM COATED ORAL at 08:50

## 2020-05-05 RX ADMIN — Medication 3 MILLIGRAM(S): at 22:30

## 2020-05-06 PROCEDURE — 99231 SBSQ HOSP IP/OBS SF/LOW 25: CPT

## 2020-05-06 RX ORDER — CLONAZEPAM 1 MG
0.25 TABLET ORAL DAILY
Refills: 0 | Status: DISCONTINUED | OUTPATIENT
Start: 2020-05-06 | End: 2020-05-08

## 2020-05-06 RX ADMIN — Medication 2 MILLIGRAM(S): at 13:27

## 2020-05-06 RX ADMIN — Medication 3 MILLIGRAM(S): at 23:06

## 2020-05-06 RX ADMIN — Medication 1 PATCH: at 09:14

## 2020-05-06 RX ADMIN — SERTRALINE 150 MILLIGRAM(S): 25 TABLET, FILM COATED ORAL at 09:14

## 2020-05-07 PROCEDURE — 99231 SBSQ HOSP IP/OBS SF/LOW 25: CPT

## 2020-05-07 RX ORDER — ACETAMINOPHEN 500 MG
650 TABLET ORAL EVERY 6 HOURS
Refills: 0 | Status: DISCONTINUED | OUTPATIENT
Start: 2020-05-07 | End: 2020-05-08

## 2020-05-07 RX ORDER — SERTRALINE 25 MG/1
3 TABLET, FILM COATED ORAL
Qty: 42 | Refills: 0
Start: 2020-05-07 | End: 2020-05-20

## 2020-05-07 RX ADMIN — Medication 1 PATCH: at 11:16

## 2020-05-07 RX ADMIN — Medication 1 PATCH: at 08:56

## 2020-05-07 RX ADMIN — Medication 3 MILLIGRAM(S): at 22:18

## 2020-05-07 RX ADMIN — Medication 650 MILLIGRAM(S): at 21:10

## 2020-05-07 RX ADMIN — SERTRALINE 150 MILLIGRAM(S): 25 TABLET, FILM COATED ORAL at 08:52

## 2020-05-08 VITALS — TEMPERATURE: 97 F

## 2020-05-08 PROCEDURE — 99238 HOSP IP/OBS DSCHRG MGMT 30/<: CPT

## 2020-05-08 RX ADMIN — SERTRALINE 150 MILLIGRAM(S): 25 TABLET, FILM COATED ORAL at 09:26

## 2020-12-09 NOTE — ED PROVIDER NOTE - TEMPLATE
Psych/Behavioral Go for blood tests as directed. Your doctor will do lab tests at regular visits to monitor the effects of this medicine. Please follow up with your doctor and keep your health care provider appointments.

## 2023-08-18 ENCOUNTER — EMERGENCY (EMERGENCY)
Facility: HOSPITAL | Age: 28
LOS: 0 days | Discharge: HOME | End: 2023-08-19
Attending: STUDENT IN AN ORGANIZED HEALTH CARE EDUCATION/TRAINING PROGRAM
Payer: COMMERCIAL

## 2023-08-18 VITALS
SYSTOLIC BLOOD PRESSURE: 117 MMHG | RESPIRATION RATE: 19 BRPM | DIASTOLIC BLOOD PRESSURE: 76 MMHG | WEIGHT: 199.96 LBS | HEART RATE: 77 BPM | HEIGHT: 69 IN | OXYGEN SATURATION: 98 % | TEMPERATURE: 99 F

## 2023-08-18 DIAGNOSIS — F41.9 ANXIETY DISORDER, UNSPECIFIED: ICD-10-CM

## 2023-08-18 DIAGNOSIS — U07.1 COVID-19: ICD-10-CM

## 2023-08-18 DIAGNOSIS — F32.9 MAJOR DEPRESSIVE DISORDER, SINGLE EPISODE, UNSPECIFIED: ICD-10-CM

## 2023-08-18 DIAGNOSIS — R07.9 CHEST PAIN, UNSPECIFIED: ICD-10-CM

## 2023-08-18 LAB
ALBUMIN SERPL ELPH-MCNC: 3.8 G/DL — SIGNIFICANT CHANGE UP (ref 3.3–5)
ALP SERPL-CCNC: 91 U/L — SIGNIFICANT CHANGE UP (ref 40–120)
ALT FLD-CCNC: 55 U/L — SIGNIFICANT CHANGE UP (ref 12–78)
ANION GAP SERPL CALC-SCNC: 5 MMOL/L — SIGNIFICANT CHANGE UP (ref 5–17)
AST SERPL-CCNC: 36 U/L — SIGNIFICANT CHANGE UP (ref 15–37)
BASOPHILS # BLD AUTO: 0.02 K/UL — SIGNIFICANT CHANGE UP (ref 0–0.2)
BASOPHILS NFR BLD AUTO: 0.2 % — SIGNIFICANT CHANGE UP (ref 0–2)
BILIRUB SERPL-MCNC: 0.4 MG/DL — SIGNIFICANT CHANGE UP (ref 0.2–1.2)
BUN SERPL-MCNC: 17 MG/DL — SIGNIFICANT CHANGE UP (ref 7–23)
CALCIUM SERPL-MCNC: 8.8 MG/DL — SIGNIFICANT CHANGE UP (ref 8.5–10.1)
CHLORIDE SERPL-SCNC: 105 MMOL/L — SIGNIFICANT CHANGE UP (ref 96–108)
CO2 SERPL-SCNC: 28 MMOL/L — SIGNIFICANT CHANGE UP (ref 22–31)
CREAT SERPL-MCNC: 1 MG/DL — SIGNIFICANT CHANGE UP (ref 0.5–1.3)
D DIMER BLD IA.RAPID-MCNC: 296 NG/ML DDU — HIGH
EGFR: 106 ML/MIN/1.73M2 — SIGNIFICANT CHANGE UP
EOSINOPHIL # BLD AUTO: 0.07 K/UL — SIGNIFICANT CHANGE UP (ref 0–0.5)
EOSINOPHIL NFR BLD AUTO: 0.8 % — SIGNIFICANT CHANGE UP (ref 0–6)
GLUCOSE SERPL-MCNC: 94 MG/DL — SIGNIFICANT CHANGE UP (ref 70–99)
HCT VFR BLD CALC: 45.2 % — SIGNIFICANT CHANGE UP (ref 39–50)
HGB BLD-MCNC: 14.7 G/DL — SIGNIFICANT CHANGE UP (ref 13–17)
IMM GRANULOCYTES NFR BLD AUTO: 0.3 % — SIGNIFICANT CHANGE UP (ref 0–0.9)
LYMPHOCYTES # BLD AUTO: 0.97 K/UL — LOW (ref 1–3.3)
LYMPHOCYTES # BLD AUTO: 10.4 % — LOW (ref 13–44)
MCHC RBC-ENTMCNC: 27.1 PG — SIGNIFICANT CHANGE UP (ref 27–34)
MCHC RBC-ENTMCNC: 32.5 G/DL — SIGNIFICANT CHANGE UP (ref 32–36)
MCV RBC AUTO: 83.2 FL — SIGNIFICANT CHANGE UP (ref 80–100)
MONOCYTES # BLD AUTO: 0.53 K/UL — SIGNIFICANT CHANGE UP (ref 0–0.9)
MONOCYTES NFR BLD AUTO: 5.7 % — SIGNIFICANT CHANGE UP (ref 2–14)
NEUTROPHILS # BLD AUTO: 7.71 K/UL — HIGH (ref 1.8–7.4)
NEUTROPHILS NFR BLD AUTO: 82.6 % — HIGH (ref 43–77)
NRBC # BLD: 0 /100 WBCS — SIGNIFICANT CHANGE UP (ref 0–0)
PLATELET # BLD AUTO: 261 K/UL — SIGNIFICANT CHANGE UP (ref 150–400)
POTASSIUM SERPL-MCNC: 3.8 MMOL/L — SIGNIFICANT CHANGE UP (ref 3.5–5.3)
POTASSIUM SERPL-SCNC: 3.8 MMOL/L — SIGNIFICANT CHANGE UP (ref 3.5–5.3)
PROT SERPL-MCNC: 7.5 GM/DL — SIGNIFICANT CHANGE UP (ref 6–8.3)
RBC # BLD: 5.43 M/UL — SIGNIFICANT CHANGE UP (ref 4.2–5.8)
RBC # FLD: 13.1 % — SIGNIFICANT CHANGE UP (ref 10.3–14.5)
SODIUM SERPL-SCNC: 138 MMOL/L — SIGNIFICANT CHANGE UP (ref 135–145)
TROPONIN I, HIGH SENSITIVITY RESULT: 3.6 NG/L — SIGNIFICANT CHANGE UP
WBC # BLD: 9.71 K/UL — SIGNIFICANT CHANGE UP (ref 3.8–10.5)
WBC # FLD AUTO: 9.71 K/UL — SIGNIFICANT CHANGE UP (ref 3.8–10.5)

## 2023-08-18 PROCEDURE — 93010 ELECTROCARDIOGRAM REPORT: CPT

## 2023-08-18 PROCEDURE — 71045 X-RAY EXAM CHEST 1 VIEW: CPT | Mod: 26

## 2023-08-18 PROCEDURE — 99285 EMERGENCY DEPT VISIT HI MDM: CPT

## 2023-08-18 NOTE — ED ADULT NURSE REASSESSMENT NOTE - NS ED NURSE REASSESS COMMENT FT1
Report from Dawna GUZMAN . Introduced self and identified pt. Received alert & oriented x4, resting in stretcher, in no acute distress. IV intact and flushes. On cardiac monitor and . Denies pain at this time. Awaiting CTA.

## 2023-08-18 NOTE — ED PROVIDER NOTE - PATIENT PORTAL LINK FT
You can access the FollowMyHealth Patient Portal offered by [facility] by registering at the following website: [email or web address]. By joining [employer]’s FollowMyHealth portal, you will also be able to view your health information using other applications (apps) compatible with our system.

## 2023-08-18 NOTE — ED PROVIDER NOTE - CLINICAL SUMMARY MEDICAL DECISION MAKING FREE TEXT BOX
28 y/o M no pmhx presents w/ chest pain for the past 1 day. states he was sitting today when he had aprox 1 hour of chest pain, bilateral, sharp, intermittent in nature. states his symptoms have since resolved. endorsing being covid+ for the past 4 days. endorsing return from [place] 1 week ago  EKG no signs of acute ischemia. patient has no active chest pain currently, well appearing, not hypoxic. consider PNA, will get cxr. consider atypical ACS, low suspicion, patient has no cardiac risk factors. consider PE, will get d-dimer given COVID+ and recent flight from [place]. possible covid related chest pain as well due to chest wall inflammation. will reassess s/p imaging. cardiac monitor. 26 y/o M no pmhx presents w/ chest pain for the past 1 day. states he was sitting today when he had aprox 1 hour of chest pain, bilateral, sharp, intermittent in nature. states his symptoms have since resolved. endorsing being covid+ for the past 4 days. endorsing return from [place] 1 week ago  EKG no signs of acute ischemia. patient has no active chest pain currently, well appearing, not hypoxic. consider PNA, will get cxr. consider atypical ACS, low suspicion, patient has no cardiac risk factors. consider PE, will get d-dimer given COVID+ and recent flight from [place]. possible covid related chest pain as well due to chest wall inflammation. will reassess s/p imaging. cardiac monitor.    patient does NOT require admission at this time, not hypoxic, stable.

## 2023-08-18 NOTE — ED PROVIDER NOTE - PROGRESS NOTE DETAILS
Patient signed out to me pending CTA  CTA with no PE  Patient oxygenating normally on RA  Patient will be discharged

## 2023-08-18 NOTE — ED PROVIDER NOTE - NSFOLLOWUPINSTRUCTIONS_ED_ALL_ED_FT
Please quarantine per CDC, return for persistent fever, nausea, vomiting, SOB or other concerning symptoms

## 2023-08-18 NOTE — ED PROVIDER NOTE - OBJECTIVE STATEMENT
28 y/o M no pmhx presents w/ 26 y/o M no pmhx presents w/ chest pain for the past 1 day. states he was sitting 28 y/o M no pmhx presents w/ chest pain for the past 1 day. states he was sitting today when he had aprox 1 hour of chest pain, bilateral, sharp, intermittent in nature. states his symptoms have since resolved. endorsing being covid+ for the past 4 days. endorsing return from [place] 1 week ago. denies cough/runny nose. denies fever/chills. denies nausea/vomiting. denies abdominal pain. denies recent surgeries.

## 2023-08-18 NOTE — ED ADULT TRIAGE NOTE - SPO2 (%)
Patient called today with regards to the following medication refill request:    Provider: Jay Schuler MD  Medication:  Pantoprazole 40 mg   Tizanidine 4 mg    Pharmacy:   [facility] [address] [address]  [address] [facility] [address]  Phone: [phone] Fax: [phone]      For additional information, or if you need to contact the patient, please call patient at the following number:      Mobile [phone]       Patient states that it is okay to leave a detailed message.    --patient said fax was sent over from the pharmacy, but wanted me to also add this message in   98

## 2023-08-18 NOTE — ED ADULT NURSE NOTE - NSICDXPASTMEDICALHX_GEN_ALL_CORE_FT
PAST MEDICAL HISTORY:  Anxiety     MDD (major depressive disorder)     No pertinent past medical history

## 2023-08-18 NOTE — ED PROVIDER NOTE - DIFFERENTIAL DIAGNOSIS
Differential Diagnosis COVID, chest wall pain, costochondritis, PE, pneumonia, atypical ACS, viral syndrome

## 2023-08-18 NOTE — ED ADULT NURSE NOTE - OBJECTIVE STATEMENT
Pt is A&OX4, ambulatory. States his mother tested positive for Covid on Sunday. Did a at home test on Monday, he was positive for covid. Denied symptoms. Today complaining of mid sternal chest pain that radiates to left chest and left back. Now not complaining of pain. Denies SOB, lightheadedness, dizziness, headache. pmh of anxiety, depression.

## 2023-08-18 NOTE — ED PROVIDER NOTE - PHYSICAL EXAMINATION
General: Well appearing male in no acute distress  HEENT: Normocephalic, atraumatic. Moist mucous membranes. Oropharynx clear. No lymphadenopathy.  Eyes: No scleral icterus. EOMI. HEIDY.  Neck:. Soft and supple. Full ROM without pain. No midline tenderness  Cardiac: Regular rate and regular rhythm. No murmurs, rubs, gallops. Peripheral pulses 2+ and symmetric. No LE edema.  Resp: Lungs CTAB. Speaking in full sentences. No wheezes, rales or rhonchi.  Abd: Soft, non-tender, non-distended. No guarding or rebound. No scars, masses, or lesions.  Back: Spine midline and non-tender. No CVA tenderness.    Skin: No rashes, abrasions, or lacerations.  Neuro: AO x 3. Moves all extremities symmetrically. Motor strength and sensation grossly intact.

## 2023-08-19 VITALS
DIASTOLIC BLOOD PRESSURE: 65 MMHG | RESPIRATION RATE: 18 BRPM | OXYGEN SATURATION: 99 % | SYSTOLIC BLOOD PRESSURE: 100 MMHG | HEART RATE: 77 BPM | TEMPERATURE: 98 F

## 2023-08-19 PROBLEM — F41.9 ANXIETY DISORDER, UNSPECIFIED: Chronic | Status: ACTIVE | Noted: 2020-04-27

## 2023-08-19 PROBLEM — F32.9 MAJOR DEPRESSIVE DISORDER, SINGLE EPISODE, UNSPECIFIED: Chronic | Status: ACTIVE | Noted: 2020-04-27

## 2023-08-19 LAB
RAPID RVP RESULT: DETECTED
SARS-COV-2 RNA SPEC QL NAA+PROBE: DETECTED

## 2023-08-19 PROCEDURE — G1004: CPT

## 2023-08-19 PROCEDURE — 71275 CT ANGIOGRAPHY CHEST: CPT | Mod: 26,ME

## 2024-09-10 NOTE — ED BEHAVIORAL HEALTH ASSESSMENT NOTE - NS ED BHA MED ROS EYES
Called pt in regards to scheduling her Botox appt. LMOM for pt to call us back to schedule her appt. Pt will be using STOCK/200 units   No complaints